# Patient Record
Sex: MALE | Race: BLACK OR AFRICAN AMERICAN | NOT HISPANIC OR LATINO | URBAN - METROPOLITAN AREA
[De-identification: names, ages, dates, MRNs, and addresses within clinical notes are randomized per-mention and may not be internally consistent; named-entity substitution may affect disease eponyms.]

---

## 2020-02-11 ENCOUNTER — HOSPITAL ENCOUNTER (EMERGENCY)
Facility: OTHER | Age: 65
Discharge: HOME | End: 2020-02-11
Attending: EMERGENCY MEDICINE
Payer: MEDICAID

## 2020-02-11 VITALS
RESPIRATION RATE: 17 BRPM | BODY MASS INDEX: 19.6 KG/M2 | HEIGHT: 71 IN | DIASTOLIC BLOOD PRESSURE: 84 MMHG | SYSTOLIC BLOOD PRESSURE: 149 MMHG | OXYGEN SATURATION: 97 % | HEART RATE: 89 BPM | WEIGHT: 140 LBS

## 2020-02-11 DIAGNOSIS — F10.920 ACUTE ALCOHOLIC INTOXICATION WITHOUT COMPLICATION: Primary | ICD-10-CM

## 2020-02-11 PROCEDURE — 99281 EMR DPT VST MAYX REQ PHY/QHP: CPT

## 2020-02-11 NOTE — ED NOTES
Pt AAOx4, resp pattern even and non labored. The pt denies any needs at this point. 0/10 pain. Bed locked in lowest position, call light within reach. WCTM

## 2020-02-11 NOTE — ED PROVIDER NOTES
Encounter Date: 2/11/2020    SCRIBE #1 NOTE: I, Radha Armstrong, am scribing for, and in the presence of, Dr. Cortez.       History     Chief Complaint   Patient presents with    Alcohol Intoxication     EMS called by bystander for ETOH. slurred speech upon arrival      Time seen by provider: 2:42 PM    This is a 64 y.o. male who presents due to alcohol intoxication. Patient admits to use of alcohol and marijuana today. He reports falling but denies striking his head or loss of consciousness. Patient denies any pain currently. Per EMS, patient was brought here to the ED because he was too drunk to walk 2 blocks from him home or be brought to the sober house. Patient denies past medical history.  He denies any complaints at this time.    The history is provided by the patient and the EMS personnel.     Review of patient's allergies indicates:  No Known Allergies  Past Medical History:   Diagnosis Date    ETOH abuse      History reviewed. No pertinent surgical history.  History reviewed. No pertinent family history.  Social History     Tobacco Use    Smoking status: Current Some Day Smoker   Substance Use Topics    Alcohol use: Yes     Alcohol/week: 25.0 standard drinks     Types: 25 Shots of liquor per week    Drug use: Yes     Types: Cocaine     Review of Systems   Constitutional: Negative for chills.   HENT: Negative for congestion and sore throat.    Eyes: Negative for photophobia.   Respiratory: Negative for shortness of breath.    Cardiovascular: Negative for chest pain.   Gastrointestinal: Negative for abdominal pain, nausea and vomiting.   Genitourinary: Negative for dysuria.   Musculoskeletal: Negative for back pain and neck pain.   Skin: Negative for rash and wound.   Neurological: Negative for weakness and headaches.       Physical Exam     Initial Vitals [02/11/20 1353]   BP Pulse Resp Temp SpO2   (!) 155/82 110 20 -- 97 %      MAP       --         Physical Exam    Nursing note and vitals  reviewed.  Constitutional: He appears well-developed and well-nourished. No distress.   Odor of alcohol and marijuana.    HENT:   Head: Normocephalic and atraumatic.   Eyes: Conjunctivae and EOM are normal.   Neck: Normal range of motion. Neck supple.   Neck non tender.   Cardiovascular: Normal rate, regular rhythm and normal heart sounds. Exam reveals no gallop and no friction rub.    No murmur heard.  Pulmonary/Chest: Breath sounds normal. No respiratory distress. He has no wheezes. He has no rhonchi. He has no rales.   Abdominal: Soft. There is no tenderness. There is no rebound and no guarding.   Musculoskeletal: Normal range of motion. He exhibits no edema or tenderness.   Neurological: He is alert and oriented to person, place, and time. GCS score is 15. GCS eye subscore is 4. GCS verbal subscore is 5. GCS motor subscore is 6.   Slurred speech. Unsteady gait. No gross sensory motor deficits.   Skin: Skin is dry. No rash noted.   Healing abrasions on MCP and PIP joints of left hand. Various ecchymoses on extension surfaces of varying ages.   Psychiatric: He has a normal mood and affect.         ED Course   Procedures  Labs Reviewed - No data to display       Imaging Results    None          Medical Decision Making:   History:   Old Medical Records: I decided to obtain old medical records.            Scribe Attestation:   Scribe #1: I performed the above scribed service and the documentation accurately describes the services I performed. I attest to the accuracy of the note.    Attending Attestation:           Physician Attestation for Scribe:  Physician Attestation Statement for Scribe #1: I, Dr. Cortez, reviewed documentation, as scribed by Radha Armstrong in my presence, and it is both accurate and complete.                    Patient presents by EMS.  History of alcohol abuse, admits to drinking heavily today.  EMS was called as he was apparently too intoxicated to walk.  No trauma per bystanders.  Upon arrival  he has no complaints.  No signs of acute trauma on exam.  Exam consistent with ETOH intoxication.  No focal deficits.  Patient's mental status is improving during his emergency department stay.  He is more consistently alert. His care is turned over at 7:00 p.m. pending clinical sobriety.  Expect patient to be able to be discharged to home.           Clinical Impression:     1. Acute alcoholic intoxication without complication                              Yfn Cortez II, MD  02/11/20 2576

## 2020-02-11 NOTE — ED TRIAGE NOTES
Pt comes to ED3 via EMS.  Bystander on street called 911 because patient was so drunk.  On arrival, his speech is slurred, reeks of ETOH and unsteady on feet.  He states he has been smoking weed.  States he has had 2 pints of gin today.

## 2020-02-12 NOTE — ED NOTES
Pt resting in hallway on ed stretcher. Pt denies any needs at this time, 0/10 pain. Pt Awake and alert, resp pattern even and non labored. Bed locked in lowest position, WCTM

## 2020-02-12 NOTE — ED NOTES
Pt is awake and alert lying on stretcher in hallway with no complaints at this time. Pt will continue to be monitored at this time.

## 2025-06-09 ENCOUNTER — HOSPITAL ENCOUNTER (EMERGENCY)
Facility: HOSPITAL | Age: 70
Discharge: HOME | End: 2025-06-10
Attending: EMERGENCY MEDICINE
Payer: MEDICAID

## 2025-06-09 DIAGNOSIS — F10.920 ALCOHOLIC INTOXICATION WITHOUT COMPLICATION: Primary | ICD-10-CM

## 2025-06-09 DIAGNOSIS — I63.9 INFARCTION OF LEFT BASAL GANGLIA: ICD-10-CM

## 2025-06-09 LAB
ABSOLUTE EOSINOPHIL (OHS): 0.01 K/UL
ABSOLUTE MONOCYTE (OHS): 1.27 K/UL (ref 0.3–1)
ABSOLUTE NEUTROPHIL COUNT (OHS): 9.62 K/UL (ref 1.8–7.7)
ALBUMIN SERPL BCP-MCNC: 3.6 G/DL (ref 3.5–5.2)
ALP SERPL-CCNC: 85 UNIT/L (ref 40–150)
ALT SERPL W/O P-5'-P-CCNC: 7 UNIT/L (ref 10–44)
AMMONIA PLAS-SCNC: 52 UMOL/L (ref 10–50)
AMPHET UR QL SCN: NEGATIVE
ANION GAP (OHS): 16 MMOL/L (ref 8–16)
AST SERPL-CCNC: 26 UNIT/L (ref 11–45)
BARBITURATE SCN PRESENT UR: NEGATIVE
BASOPHILS # BLD AUTO: 0.07 K/UL
BASOPHILS NFR BLD AUTO: 0.6 %
BENZODIAZ UR QL SCN: NEGATIVE
BILIRUB SERPL-MCNC: 0.3 MG/DL (ref 0.1–1)
BUN SERPL-MCNC: 8 MG/DL (ref 8–23)
CALCIUM SERPL-MCNC: 8.5 MG/DL (ref 8.7–10.5)
CANNABINOIDS UR QL SCN: ABNORMAL
CHLORIDE SERPL-SCNC: 101 MMOL/L (ref 95–110)
CO2 SERPL-SCNC: 21 MMOL/L (ref 23–29)
COCAINE UR QL SCN: NEGATIVE
CREAT SERPL-MCNC: 0.8 MG/DL (ref 0.5–1.4)
CREAT UR-MCNC: 39 MG/DL (ref 23–375)
CREAT UR-MCNC: 39 MG/DL (ref 23–375)
ERYTHROCYTE [DISTWIDTH] IN BLOOD BY AUTOMATED COUNT: 16 % (ref 11.5–14.5)
ETHANOL SERPL-MCNC: 276 MG/DL
FENTANYL UR QL SCN: NEGATIVE
GFR SERPLBLD CREATININE-BSD FMLA CKD-EPI: >60 ML/MIN/1.73/M2
GLUCOSE SERPL-MCNC: 121 MG/DL (ref 70–110)
HCT VFR BLD AUTO: 44.7 % (ref 40–54)
HCV AB SERPL QL IA: REACTIVE
HGB BLD-MCNC: 14.6 GM/DL (ref 14–18)
HIV 1+2 AB+HIV1 P24 AG SERPL QL IA: NORMAL
HOLD SPECIMEN: NORMAL
IMM GRANULOCYTES # BLD AUTO: 0.07 K/UL (ref 0–0.04)
IMM GRANULOCYTES NFR BLD AUTO: 0.6 % (ref 0–0.5)
LYMPHOCYTES # BLD AUTO: 1.54 K/UL (ref 1–4.8)
MAGNESIUM SERPL-MCNC: 1.7 MG/DL (ref 1.6–2.6)
MCH RBC QN AUTO: 28.1 PG (ref 27–31)
MCHC RBC AUTO-ENTMCNC: 32.7 G/DL (ref 32–36)
MCV RBC AUTO: 86 FL (ref 82–98)
METHADONE UR QL SCN: NEGATIVE
NUCLEATED RBC (/100WBC) (OHS): 0 /100 WBC
OPIATES UR QL SCN: NEGATIVE
PCP UR QL: NEGATIVE
PLATELET # BLD AUTO: 231 K/UL (ref 150–450)
PMV BLD AUTO: 10 FL (ref 9.2–12.9)
POTASSIUM SERPL-SCNC: 4 MMOL/L (ref 3.5–5.1)
PROT SERPL-MCNC: 8 GM/DL (ref 6–8.4)
RBC # BLD AUTO: 5.19 M/UL (ref 4.6–6.2)
RELATIVE EOSINOPHIL (OHS): 0.1 %
RELATIVE LYMPHOCYTE (OHS): 12.2 % (ref 18–48)
RELATIVE MONOCYTE (OHS): 10.1 % (ref 4–15)
RELATIVE NEUTROPHIL (OHS): 76.4 % (ref 38–73)
SODIUM SERPL-SCNC: 138 MMOL/L (ref 136–145)
WBC # BLD AUTO: 12.58 K/UL (ref 3.9–12.7)

## 2025-06-09 PROCEDURE — 87522 HEPATITIS C REVRS TRNSCRPJ: CPT | Performed by: EMERGENCY MEDICINE

## 2025-06-09 PROCEDURE — 80307 DRUG TEST PRSMV CHEM ANLYZR: CPT | Performed by: EMERGENCY MEDICINE

## 2025-06-09 PROCEDURE — 85025 COMPLETE CBC W/AUTO DIFF WBC: CPT | Performed by: EMERGENCY MEDICINE

## 2025-06-09 PROCEDURE — 86803 HEPATITIS C AB TEST: CPT | Performed by: EMERGENCY MEDICINE

## 2025-06-09 PROCEDURE — 25000003 PHARM REV CODE 250: Performed by: EMERGENCY MEDICINE

## 2025-06-09 PROCEDURE — 96374 THER/PROPH/DIAG INJ IV PUSH: CPT

## 2025-06-09 PROCEDURE — 80053 COMPREHEN METABOLIC PANEL: CPT | Performed by: EMERGENCY MEDICINE

## 2025-06-09 PROCEDURE — 99284 EMERGENCY DEPT VISIT MOD MDM: CPT | Mod: 25

## 2025-06-09 PROCEDURE — 87389 HIV-1 AG W/HIV-1&-2 AB AG IA: CPT | Performed by: EMERGENCY MEDICINE

## 2025-06-09 PROCEDURE — 82140 ASSAY OF AMMONIA: CPT | Performed by: EMERGENCY MEDICINE

## 2025-06-09 PROCEDURE — 82077 ASSAY SPEC XCP UR&BREATH IA: CPT | Performed by: EMERGENCY MEDICINE

## 2025-06-09 PROCEDURE — 63600175 PHARM REV CODE 636 W HCPCS: Performed by: EMERGENCY MEDICINE

## 2025-06-09 PROCEDURE — 83735 ASSAY OF MAGNESIUM: CPT | Performed by: EMERGENCY MEDICINE

## 2025-06-09 RX ORDER — THIAMINE HYDROCHLORIDE 100 MG/ML
400 INJECTION, SOLUTION INTRAMUSCULAR; INTRAVENOUS
Status: COMPLETED | OUTPATIENT
Start: 2025-06-09 | End: 2025-06-09

## 2025-06-09 RX ORDER — FOLIC ACID 1 MG/1
1 TABLET ORAL
Status: COMPLETED | OUTPATIENT
Start: 2025-06-09 | End: 2025-06-09

## 2025-06-09 RX ADMIN — THIAMINE HYDROCHLORIDE 400 MG: 100 INJECTION, SOLUTION INTRAMUSCULAR; INTRAVENOUS at 09:06

## 2025-06-09 RX ADMIN — FOLIC ACID 1 MG: 1 TABLET ORAL at 09:06

## 2025-06-10 VITALS
RESPIRATION RATE: 18 BRPM | SYSTOLIC BLOOD PRESSURE: 118 MMHG | HEIGHT: 74 IN | HEART RATE: 101 BPM | BODY MASS INDEX: 17.97 KG/M2 | DIASTOLIC BLOOD PRESSURE: 72 MMHG | WEIGHT: 140 LBS | OXYGEN SATURATION: 98 % | TEMPERATURE: 99 F

## 2025-06-10 LAB
HCV RNA SERPL NAA+PROBE-ACNC: ABNORMAL IU/ML
HCV RNA SERPL NAA+PROBE-LOG IU: 6.03 LOG IU/ML
HCV RNA SERPL NAA+PROBE-LOG IU: DETECTED {LOG_IU}/ML

## 2025-06-10 NOTE — PROVIDER PROGRESS NOTES - EMERGENCY DEPT.
"Encounter Date: 6/9/2025    ED Physician Progress Notes        Physician Note:   ED RESIDENT  PHYSICIAN HAND-OFF NOTE:  11:20 PM 6/9/2025  Bernardo Palafox Jr. is a 70 y.o. male who presented to the ED on 6/9/2025 and has been managed by , who reports patient C/O alcohol intoxication. I assumed care of patient from off-going ED physician team at 11:20 PM pending sobriety and head CT.    On my evaluation, Bernardo Palafox Jr. appears well, hemodynamically stable and in NAD. Thus far, Bernardo Palafox Jr. has received:  Medications  thiamine injection 400 mg (400 mg Intravenous Given 6/9/25 2105)  folic acid tablet 1 mg (1 mg Oral Given 6/9/25 2114)    On my exam, I appreciate:  /76   Pulse 102   Temp 98.5 °F (36.9 °C) (Oral)   Resp 18   Ht 6' 2" (1.88 m)   Wt 63.5 kg (139 lb 15.9 oz)   SpO2 97%   BMI 17.97 kg/m²   Constitutional: Well-appearing; Well-Nourished; Non-Toxic-appearing and in NAD.  Head/Face: AT/NC & No Facial asymmetry.  Oropharynx: Speaking Full Sentences with No drooling or slurring of speech.  Cardiovascular: Reg Rate; Reg Rhythm; No Murmurs.  Pulmonary/Chest: AT Thorax with Lungs CTA B/L.  Abdominal: Soft, ND, NT.  Musculoskeletal: FROM, NML Gait.  Neuro/Psych: Calm; Cooperative, Following Command, Answering Questions Appropriately, and No Gait/Balance deficits. SILT bilateral upper and lower extremities.    The patient's workup was notable for an age indeterminate basal ganglia infarction on the left. Given vascular neuro f/u. Clinically sober at time of dc. Ambulatory, tolerating PO intake well. Stable at time of discharge. Normal neuro exam.     Disposition:  DC to home  I have discussed and counseled Bernardo Palafox Jr. regarding exam, results, diagnosis, treatment, and plan.  ______________________  Reji Bishop DO  Emergency Medicine [id number]      Staff Attestation:     Care was transitioned to me by previous staff. I agree with the resident care that " occurred during my shift.    11:20 PM 6/9/2025

## 2025-06-10 NOTE — DISCHARGE INSTRUCTIONS
You were seen in the emergency department for alcohol intoxication.  Your workup and evaluation were reassuring that nothing emergent was going on today.  You were found to have an unknown age stroke of part of your brain.  Please continue to take your medications as prescribed.  We have provided stroke neurology follow up.  Please follow up with them as soon as possible.  Please return to the emergency department if your symptoms worsen or change.

## 2025-06-10 NOTE — ED PROVIDER NOTES
Encounter Date: 6/9/2025       History     Chief Complaint   Patient presents with    Alcohol Intoxication     Coming from home. Pt stumbling around house. Pt endorses drinking two bottles of liquors before arrival. Family called EMS because patient almost had several falls.      HPI  70-year-old male history of alcohol use disorder presents to the emergency department after being picked up by EMS for drunk and behavior in public.  Patient states he a 1 to many unsure of how much she drank.  Denies trauma falls or pain.  Patient is an unreliable historian.  Does answer questions appropriately however appears altered.    Patient has no complaints at this time. Denies VANG, LOC, SOB, Falls, Trauma, hemoptysis, hematemis, melena, fever, chills, nausea, vomiting, diarrhea.         Review of patient's allergies indicates:  No Known Allergies  Past Medical History:   Diagnosis Date    ETOH abuse      History reviewed. No pertinent surgical history.  No family history on file.  Social History[1]  Review of Systems    Physical Exam     Initial Vitals [06/09/25 2009]   BP Pulse Resp Temp SpO2   137/85 (!) 118 18 98.1 °F (36.7 °C) 99 %      MAP       --         Physical Exam    Nursing note and vitals reviewed.  Constitutional: He appears well-developed and well-nourished. Airway: Normal. Breathing: Normal. Circulation: Normal. Pulses:Radial palpable.   HENT:   Head: Normocephalic and atraumatic.   Right Ear: No lacerations. No hemotympanum.   Left Ear: No lacerations. No hemotympanum.   Nose: Nose abnormal. No nose lacerations or nasal deformity. No epistaxis.   Eyes: Pupils: Normal pupils. EOM are normal. Right eye exhibits no discharge. Left eye exhibits no discharge. No scleral icterus.   Neck: Neck supple. No tracheal deviation present. No JVD present.   Normal range of motion.  Cardiovascular:  Normal heart sounds and intact distal pulses.     Exam reveals no gallop and no friction rub.       No murmur  heard.  Pulmonary/Chest: Breath sounds normal. No stridor. No respiratory distress. He has no wheezes. He has no rales. He exhibits no tenderness.   Abdominal: Abdomen is soft. He exhibits no distension. There is no abdominal tenderness. There is no rebound.   Musculoskeletal:         General: No tenderness or edema.      Right upper arm: No swelling, deformity or bony tenderness.      Left upper arm: No swelling, deformity or bony tenderness.      Right forearm: No swelling, deformity or bony tenderness.      Left forearm: No swelling, deformity or bony tenderness.      Cervical back: Normal range of motion and neck supple. No deformity, spasms or bony tenderness. Normal range of motion.      Thoracic back: No deformity, spasms or bony tenderness. Normal range of motion.      Lumbar back: No deformity, spasms or bony tenderness. Normal range of motion.      Right upper leg: No swelling, deformity or bony tenderness.      Left upper leg: No swelling, deformity or bony tenderness.      Right lower leg: No swelling, deformity or bony tenderness.      Left lower leg: No swelling, deformity or bony tenderness.     Neurological: He is alert and oriented to person, place, and time. GCS score is 15. GCS eye subscore is 4. GCS verbal subscore is 5. GCS motor subscore is 6.   Skin: Skin is warm. Capillary refill takes less than 2 seconds.   Psychiatric: He has a normal mood and affect. His behavior is normal. Judgment and thought content normal.         ED Course   Procedures  Labs Reviewed   COMPREHENSIVE METABOLIC PANEL - Abnormal       Result Value    Sodium 138      Potassium 4.0      Chloride 101      CO2 21 (*)     Glucose 121 (*)     BUN 8      Creatinine 0.8      Calcium 8.5 (*)     Protein Total 8.0      Albumin 3.6      Bilirubin Total 0.3      ALP 85      AST 26      ALT 7 (*)     Anion Gap 16      eGFR >60     AMMONIA - Abnormal    Ammonia 52 (*)    ALCOHOL,MEDICAL (ETHANOL) - Abnormal    Alcohol, Serum 276 (*)     CBC WITH DIFFERENTIAL - Abnormal    WBC 12.58      RBC 5.19      HGB 14.6      HCT 44.7      MCV 86      MCH 28.1      MCHC 32.7      RDW 16.0 (*)     Platelet Count 231      MPV 10.0      Nucleated RBC 0      Neut % 76.4 (*)     Lymph % 12.2 (*)     Mono % 10.1      Eos % 0.1      Basophil % 0.6      Imm Grans % 0.6 (*)     Neut # 9.62 (*)     Lymph # 1.54      Mono # 1.27 (*)     Eos # 0.01      Baso # 0.07      Imm Grans # 0.07 (*)    MAGNESIUM - Normal    Magnesium  1.7     CBC W/ AUTO DIFFERENTIAL    Narrative:     The following orders were created for panel order CBC auto differential.  Procedure                               Abnormality         Status                     ---------                               -----------         ------                     CBC with Differential[[id number]]       Abnormal            Final result                 Please view results for these tests on the individual orders.   HEPATITIS C ANTIBODY   HEP C VIRUS HOLD SPECIMEN   HIV 1 / 2 ANTIBODY   FENTANYL, URINE   DRUG SCREEN PANEL, URINE EMERGENCY          Imaging Results    None          Medications   thiamine injection 400 mg (400 mg Intravenous Given 6/9/25 2105)   folic acid tablet 1 mg (1 mg Oral Given 6/9/25 2114)     Medical Decision Making                                  70-year-old male presents altered mental status.    Differential diagnosis includes but isn't limited to alcohol intoxication intracranial hemorrhage, metabolic encephalopathy, drug intoxication.    Head CT ordered due to poor historian.  Folic acid given.    At time of sign-out patient was pending head CT.    Vital signs stable at time of sign-out.        Clinical Impression:  Final diagnoses:  [F10.920] Alcoholic intoxication without complication (Primary)                       [1]   Social History  Tobacco Use    Smoking status: Some Days   Substance Use Topics    Alcohol use: Yes     Alcohol/week: 25.0 standard drinks of alcohol     Types: 25  Shots of liquor per week    Drug use: Yes     Types: Cocaine        Pablito Thomas MD  Resident  06/09/25 1524

## 2025-06-10 NOTE — FIRST PROVIDER EVALUATION
"Medical screening examination initiated.  I have conducted a focused provider triage encounter, findings are as follows:    Brief history of present illness:  70-year-old male presents via EMS.  EMS called by family he has a patient was intoxicated from drinking 2 bottles of liquor.  Nearly fell multiple times due to intoxication.  History of intermittent visits for intoxication at various emergency departments    Vitals:    06/09/25 2009   BP: 137/85   Pulse: (!) 118   Resp: 18   Temp: 98.1 °F (36.7 °C)   TempSrc: Oral   SpO2: 99%   Weight: 63.5 kg (140 lb)   Height: 6' 2" (1.88 m)       Pertinent physical exam:  Slurred speech, tachycardia, unsteady gait.  Patient urinating on floor of hallway due to inability to ambulate to restroom    Brief workup plan:  CBC, CMP, ETOH level, CT head    Preliminary workup initiated; this workup will be continued and followed by the physician or advanced practice provider that is assigned to the patient when roomed.  "

## 2025-06-14 ENCOUNTER — RESULTS FOLLOW-UP (OUTPATIENT)
Dept: EMERGENCY MEDICINE | Facility: HOSPITAL | Age: 70
End: 2025-06-14

## 2025-06-14 DIAGNOSIS — B19.20 HEPATITIS C TEST POSITIVE: Primary | ICD-10-CM

## 2025-07-17 ENCOUNTER — TELEPHONE (OUTPATIENT)
Dept: HEPATOLOGY | Facility: CLINIC | Age: 70
End: 2025-07-17
Payer: MEDICARE

## 2025-07-17 NOTE — TELEPHONE ENCOUNTER
Kb Guadalupe PA-C ordered that patient be scheduled for a hepatology consult visit for hep c.  Attempt made to reach patient for scheduling.  I left a VM and sent a letter asking that patient call to setup visit with PA Scheuermann.